# Patient Record
Sex: MALE | Race: ASIAN | NOT HISPANIC OR LATINO | URBAN - METROPOLITAN AREA
[De-identification: names, ages, dates, MRNs, and addresses within clinical notes are randomized per-mention and may not be internally consistent; named-entity substitution may affect disease eponyms.]

---

## 2019-12-26 ENCOUNTER — EMERGENCY (EMERGENCY)
Facility: HOSPITAL | Age: 29
LOS: 1 days | Discharge: HOME | End: 2019-12-26
Admitting: EMERGENCY MEDICINE
Payer: COMMERCIAL

## 2019-12-26 VITALS
DIASTOLIC BLOOD PRESSURE: 77 MMHG | RESPIRATION RATE: 18 BRPM | SYSTOLIC BLOOD PRESSURE: 114 MMHG | HEART RATE: 95 BPM | TEMPERATURE: 98 F | OXYGEN SATURATION: 100 %

## 2019-12-26 PROCEDURE — 73562 X-RAY EXAM OF KNEE 3: CPT | Mod: 26,LT

## 2019-12-26 PROCEDURE — 99283 EMERGENCY DEPT VISIT LOW MDM: CPT

## 2019-12-26 RX ORDER — IBUPROFEN 200 MG
600 TABLET ORAL ONCE
Refills: 0 | Status: COMPLETED | OUTPATIENT
Start: 2019-12-26 | End: 2019-12-26

## 2019-12-26 RX ADMIN — Medication 600 MILLIGRAM(S): at 14:01

## 2019-12-26 NOTE — ED PROVIDER NOTE - OBJECTIVE STATEMENT
[age] male with no pmh presents complaining of L knee pain and swelling since yesterday. States he was playing basketball when he landed on his foot weird and felt his knee "go one way and his body another way". Reports hearing a crack. States he was with his friends who are a PA and a doctor and that they did ROM testing. Reports being able to move FROM and walk with weightbearing on the leg after the injury. His friends suggested he go home ice/heat and take tylenol for the pain. He stated he was able to sleep fine with very little pain but when he awoke his knee was very swollen and much more painful causing him to feel unsteady when trying to weight bear and prompting his visit to the emergency room. Denies pain in the hip, thighs, calf and ankle and other associated symptoms.

## 2019-12-26 NOTE — ED ADULT TRIAGE NOTE - CHIEF COMPLAINT QUOTE
Pt with co left knee injury, pt states fell yesterday. pt reports was walking fine after injury but this am developed severe pain and noticed knee was swollen.

## 2019-12-26 NOTE — ED PROVIDER NOTE - NSFOLLOWUPINSTRUCTIONS_ED_ALL_ED_FT
Use knee immobilizer when walking or moving around. Can take off while resting, sleeping and showering. Use crutches to move around.   Call orthopedic clinic to make follow up appointment for further evaluation.   Continue to ice for today and then use heat compresses for 20minutes at a time as many times a day as you can to help with inflammation and pain.   Can take Motrin 600mg every 6 hours with food as needed for pain.

## 2019-12-26 NOTE — ED PROVIDER NOTE - PROGRESS NOTE DETAILS
Supervising Statement (ROXIE PLASCENCIA): I have personally seen and examined this patient.  I have fully participated in the care of this patient. I have reviewed all pertinent clinical information, including history, physical exam, plan and the ACP Fellow's note and agree except as noted.

## 2019-12-26 NOTE — ED ADULT NURSE NOTE - OBJECTIVE STATEMENT
pt brought to QL4 A&ox3, skin w/d/i, s/p mechanical fall yesterday, no signs of trauma noted, + pedal pulses, + ORm in all extremities, appears in NAD, motrina s per orders, pt moved to  for further monitoring.  (break)

## 2019-12-26 NOTE — ED PROVIDER NOTE - PATIENT PORTAL LINK FT
You can access the FollowMyHealth Patient Portal offered by [facility] by registering at the following website: [email or web address]. By joining [employer]’s FollowMyHealth portal, you will also be able to view your health information using other applications (apps) compatible with our system.

## 2019-12-26 NOTE — ED ADULT NURSE NOTE - NSIMPLEMENTINTERV_GEN_ALL_ED
Implemented All Fall Risk Interventions:  [place] to call system. Call bell, personal items and telephone within reach. Instruct patient to call for assistance. Room bathroom lighting operational. Non-slip footwear when patient is off stretcher. Physically safe environment: no spills, clutter or unnecessary equipment. Stretcher in lowest position, wheels locked, appropriate side rails in place. Provide visual cue, wrist band, yellow gown, etc. Monitor gait and stability. Monitor for mental status changes and reorient to person, place, and time. Review medications for side effects contributing to fall risk. Reinforce activity limits and safety measures with patient and family.

## 2019-12-26 NOTE — ED PROVIDER NOTE - CLINICAL SUMMARY MEDICAL DECISION MAKING FREE TEXT BOX
30yo male with no sig pmh presenting with L knee swelling and pain after landing wrong on his foot playing basketball x 1 day. Bony tenderness over medial knee. + joint effusion of the L knee on the medial and superior aspects. + pain with flexion/extension and weightbearing. FROM intact. No neurovascular compromise. Will get xray to rule out fracture. Low suspicion for dislocation, femur or tibial involvement at this time. Possible ligament injury, will consult ortho for outpatient follow up. Pain control with motrin for now.

## 2019-12-26 NOTE — ED PROVIDER NOTE - MUSCULOSKELETAL MINIMAL EXAM
atraumatic/medial and superior effusion of the knee, + pain with active and passive flexion and extension of the knee. + pain with palpation of the medial knee. No tibial or femur tenderness, no ankle or hip tenderness. No pain with axial loading or log rolling. sensation intact. +2 pulses. No crepitus./normal range of motion/no muscle tenderness

## 2019-12-27 ENCOUNTER — APPOINTMENT (OUTPATIENT)
Dept: ORTHOPEDIC SURGERY | Facility: CLINIC | Age: 29
End: 2019-12-27
Payer: COMMERCIAL

## 2019-12-27 VITALS — BODY MASS INDEX: 23.52 KG/M2 | WEIGHT: 168 LBS | HEIGHT: 71 IN

## 2019-12-27 DIAGNOSIS — S83.512A SPRAIN OF ANTERIOR CRUCIATE LIGAMENT OF LEFT KNEE, INITIAL ENCOUNTER: ICD-10-CM

## 2019-12-27 PROCEDURE — 99204 OFFICE O/P NEW MOD 45 MIN: CPT | Mod: 25

## 2019-12-27 PROCEDURE — 20610 DRAIN/INJ JOINT/BURSA W/O US: CPT | Mod: LT

## 2019-12-30 ENCOUNTER — FORM ENCOUNTER (OUTPATIENT)
Age: 29
End: 2019-12-30

## 2019-12-31 ENCOUNTER — OUTPATIENT (OUTPATIENT)
Dept: OUTPATIENT SERVICES | Facility: HOSPITAL | Age: 29
LOS: 1 days | End: 2019-12-31
Payer: COMMERCIAL

## 2019-12-31 ENCOUNTER — APPOINTMENT (OUTPATIENT)
Dept: MRI IMAGING | Facility: CLINIC | Age: 29
End: 2019-12-31
Payer: COMMERCIAL

## 2019-12-31 DIAGNOSIS — S83.512A SPRAIN OF ANTERIOR CRUCIATE LIGAMENT OF LEFT KNEE, INITIAL ENCOUNTER: ICD-10-CM

## 2019-12-31 PROCEDURE — 73721 MRI JNT OF LWR EXTRE W/O DYE: CPT

## 2019-12-31 PROCEDURE — 73721 MRI JNT OF LWR EXTRE W/O DYE: CPT | Mod: 26,LT

## 2022-07-01 ENCOUNTER — APPOINTMENT (OUTPATIENT)
Dept: INTERNAL MEDICINE | Facility: CLINIC | Age: 32
End: 2022-07-01

## 2022-07-01 ENCOUNTER — NON-APPOINTMENT (OUTPATIENT)
Age: 32
End: 2022-07-01

## 2022-07-01 VITALS
OXYGEN SATURATION: 98 % | HEART RATE: 102 BPM | SYSTOLIC BLOOD PRESSURE: 122 MMHG | WEIGHT: 170 LBS | HEIGHT: 71 IN | BODY MASS INDEX: 23.8 KG/M2 | DIASTOLIC BLOOD PRESSURE: 77 MMHG | TEMPERATURE: 98.5 F

## 2022-07-01 DIAGNOSIS — Z23 ENCOUNTER FOR IMMUNIZATION: ICD-10-CM

## 2022-07-01 DIAGNOSIS — Z82.49 FAMILY HISTORY OF ISCHEMIC HEART DISEASE AND OTHER DISEASES OF THE CIRCULATORY SYSTEM: ICD-10-CM

## 2022-07-01 DIAGNOSIS — R45.89 OTHER SYMPTOMS AND SIGNS INVOLVING EMOTIONAL STATE: ICD-10-CM

## 2022-07-01 DIAGNOSIS — Z87.898 PERSONAL HISTORY OF OTHER SPECIFIED CONDITIONS: ICD-10-CM

## 2022-07-01 DIAGNOSIS — Z83.438 FAMILY HISTORY OF OTHER DISORDER OF LIPOPROTEIN METABOLISM AND OTHER LIPIDEMIA: ICD-10-CM

## 2022-07-01 DIAGNOSIS — Z78.9 OTHER SPECIFIED HEALTH STATUS: ICD-10-CM

## 2022-07-01 DIAGNOSIS — Z83.3 FAMILY HISTORY OF DIABETES MELLITUS: ICD-10-CM

## 2022-07-01 DIAGNOSIS — Z82.41 FAMILY HISTORY OF SUDDEN CARDIAC DEATH: ICD-10-CM

## 2022-07-01 DIAGNOSIS — Z80.41 FAMILY HISTORY OF MALIGNANT NEOPLASM OF OVARY: ICD-10-CM

## 2022-07-01 PROCEDURE — 99203 OFFICE O/P NEW LOW 30 MIN: CPT | Mod: 25

## 2022-07-01 PROCEDURE — 99385 PREV VISIT NEW AGE 18-39: CPT

## 2022-07-01 NOTE — PHYSICAL EXAM
[No Acute Distress] : no acute distress [Well-Appearing] : well-appearing [Normal Sclera/Conjunctiva] : normal sclera/conjunctiva [EOMI] : extraocular movements intact [Normal Outer Ear/Nose] : the outer ears and nose were normal in appearance [Normal Oropharynx] : the oropharynx was normal [No JVD] : no jugular venous distention [Supple] : supple [No Respiratory Distress] : no respiratory distress  [No Accessory Muscle Use] : no accessory muscle use [Clear to Auscultation] : lungs were clear to auscultation bilaterally [Normal Rate] : normal rate  [Normal S1, S2] : normal S1 and S2 [No Edema] : there was no peripheral edema [Soft] : abdomen soft [Non-distended] : non-distended [Normal Supraclavicular Nodes] : no supraclavicular lymphadenopathy [Normal Posterior Cervical Nodes] : no posterior cervical lymphadenopathy [Normal Anterior Cervical Nodes] : no anterior cervical lymphadenopathy [No Joint Swelling] : no joint swelling [Grossly Normal Strength/Tone] : grossly normal strength/tone [No Rash] : no rash [Coordination Grossly Intact] : coordination grossly intact [Normal Gait] : normal gait [Speech Grossly Normal] : speech grossly normal [Normal Affect] : the affect was normal [Normal Mood] : the mood was normal [de-identified] : mild discomfort with palpation inferior to umbilical region

## 2022-07-01 NOTE — HISTORY OF PRESENT ILLNESS
[de-identified] : 32M PMH torn ACL s/p surgical repair who presents as a new patient for annual physical.\par \par He presents because he hasn't had a check-up in 2 years and wants a more accessible PMD. \par \par He notes some depressed mood in late high school / early college in which he was prescribed Lexapro, he never took it and hasn't had further issues with depressed mood. \par \par His primary complaint is that he has been having GI issues, in which he feels he has to go to the bathroom 4-5 times after he eats a large meal. He does note in childhood he had difficulty gaining weight and some diarrhea. Notes it in the morning if he's eaten late the previous night, but sometimes it occurs shortly after eating during the day. Not specifically noting relief after passing BM. He notes sometimes some blood on the toilet paper, but not in the bowl.\par He is unsure if there is an association with any specific type of food, possibly fatty meals. \par \par Immunizations: He has been vaccinated against COVID-19 with Pfizer x3. He hasn't had the flu shot in many years, usually doesn't. He recently had a Tdap\par \par Social Hx: Denies tobacco use. Drinks ~2 beers ~3x/wk, alcoholic mihaela 2 days/wk. 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 He primarily works from home. Lives with his wife and brother.

## 2022-07-01 NOTE — HEALTH RISK ASSESSMENT
[Never] : Never [2 - 4 times a month (2 pts)] : 2-4 times a month (2 points) [1 or 2 (0 pts)] : 1 or 2 (0 points) [Less than monthly (1 pt)] : Less than monthly (1 point) [Yes] : In the past 12 months have you used drugs other than those required for medical reasons? Yes [0] : 2) Feeling down, depressed, or hopeless: Not at all (0) [PHQ-2 Negative - No further assessment needed] : PHQ-2 Negative - No further assessment needed [Audit-CScore] : 3 [de-identified] : marijuana [[id number]] : 0

## 2022-07-01 NOTE — ASSESSMENT
[FreeTextEntry1] : 32M PMH torn ACL s/p surgical repair who presents as a new patient for annual physical.\par \par # Chronic Diarrhea:\par - CRP to r/o IBD\par - Celiac antibodies\par - Stool fat, stool osms\par - Rec keeping a food/symptom log to correlate.\par - GI referral\par \par # HCM\par - Up to date with COVID-19 vaccine\par - Defers flu shot, would recommend in the Fall\par - Up to date with Tdap \par - CBC, CMP, Lipid, A1c, Vit D\par - Urinalysis\par - Routine HIV screen

## 2022-07-07 ENCOUNTER — APPOINTMENT (OUTPATIENT)
Dept: GASTROENTEROLOGY | Facility: CLINIC | Age: 32
End: 2022-07-07

## 2022-07-07 VITALS
DIASTOLIC BLOOD PRESSURE: 76 MMHG | WEIGHT: 173 LBS | BODY MASS INDEX: 24.22 KG/M2 | HEART RATE: 87 BPM | SYSTOLIC BLOOD PRESSURE: 114 MMHG | HEIGHT: 71 IN

## 2022-07-07 DIAGNOSIS — R19.7 DIARRHEA, UNSPECIFIED: ICD-10-CM

## 2022-07-07 PROCEDURE — 99204 OFFICE O/P NEW MOD 45 MIN: CPT

## 2022-07-08 NOTE — REVIEW OF SYSTEMS
[Abdominal Pain] : abdominal pain [Diarrhea] : diarrhea [Negative] : Heme/Lymph [FreeTextEntry7] : Bloated, goes to the bathroom excessively, trace of blood

## 2022-07-08 NOTE — ASSESSMENT
[FreeTextEntry1] : 1.  Intermittent loose stools, abdominal cramping, bloating.  Suspect IBS- diarrhea.  Differential includes osmotic diarrhea (lactose, sorbitol or other nonabsorbed solute, SIBO), secretory diarrhea (bile-acid, microscopic colitis, villous adenoma) or inflammatory (IBD).\par \par Recs:\par - Recent labs reviewed with the patient.  TSH, HbA1C, celiac serologies within normal limits.  Patient with elevated lipids but reports that he was on vacation the week prior and was eating poorly.  He was advised on diet given cardiac history in family (father-CABG, uncle- sudden death).\par - Patient was advised to consider lactose-free diet or use lactase replacement enzymes with dairy.  He was also advised to cut out spicy foods and minimize fatty foods which can stimulate bile acid.\par - Additional stool studies ordered today.  If calprotectin is elevated, patient was advised that he should undergo colonoscopy to rule out Crohn's disease.  If calprotectin is normal, will consider trial of antispasmodic (dicyclomine or hyoscyamine).

## 2022-07-08 NOTE — HISTORY OF PRESENT ILLNESS
[Diarrhea] : diarrhea worsened [Abdominal Pain] : abdominal pain worsened [Abdominal Swelling] : abdominal swelling worsened [de-identified] : Abraham presents to the office today for evaluation of frequent BMs, abdominal cramping, and bloating.\par \par Over the past year, the patient reports an "upset stomach" with more frequent BMs, which are either formed or loose.  He can have up to 4-5 times BMs a day.  He wakes up in the morning with cramping pain in his lower abdomen with an urge to defecate.  He will have several episodes in the morning before he feels completely evacuated.  He sometimes sees trace amount of blood on the toilet paper after multiple BMs but denies any blood in the stool.  The patient has tried to pay attention to his diet and notes worsening symptoms if he eats larger meals or fatty meals.  He also wonders if drinking beer could be contributory.  He drinks 2-3 beers several days a week.  He has tried to cut out late night eating to see if his morning symptoms would improve.  When he was younger, the patient would have 2-3 BMs a day, soon after eating a meal.  He denies any dietary changes, upper GI symptoms, pain with eating, or family history of IBD, celiac disease or cancer.  He does feel bloated if he overeats.  His grandmother had ovarian cancer.

## 2022-07-11 LAB
25(OH)D3 SERPL-MCNC: 19.4 NG/ML
ALBUMIN SERPL ELPH-MCNC: 5 G/DL
ALP BLD-CCNC: 70 U/L
ALT SERPL-CCNC: 21 U/L
ANION GAP SERPL CALC-SCNC: 13 MMOL/L
APPEARANCE: CLEAR
AST SERPL-CCNC: 20 U/L
BACTERIA: NEGATIVE
BASOPHILS # BLD AUTO: 0.02 K/UL
BASOPHILS NFR BLD AUTO: 0.4 %
BILIRUB SERPL-MCNC: 0.5 MG/DL
BILIRUBIN URINE: NEGATIVE
BLOOD URINE: NEGATIVE
BUN SERPL-MCNC: 15 MG/DL
CALCIUM SERPL-MCNC: 10 MG/DL
CHLORIDE SERPL-SCNC: 102 MMOL/L
CHOLEST SERPL-MCNC: 249 MG/DL
CO2 SERPL-SCNC: 25 MMOL/L
COLOR: YELLOW
CREAT SERPL-MCNC: 1 MG/DL
CRP SERPL-MCNC: <3 MG/L
EGFR: 103 ML/MIN/1.73M2
ENDOMYSIUM IGA SER QL: NEGATIVE
ENDOMYSIUM IGA TITR SER: NORMAL
EOSINOPHIL # BLD AUTO: 0.12 K/UL
EOSINOPHIL NFR BLD AUTO: 2.6 %
ESTIMATED AVERAGE GLUCOSE: 111 MG/DL
GLUCOSE QUALITATIVE U: NEGATIVE
GLUCOSE SERPL-MCNC: 93 MG/DL
HBA1C MFR BLD HPLC: 5.5 %
HCT VFR BLD CALC: 44.4 %
HDLC SERPL-MCNC: 73 MG/DL
HGB BLD-MCNC: 15.4 G/DL
HIV1+2 AB SPEC QL IA.RAPID: NONREACTIVE
HYALINE CASTS: 0 /LPF
IMM GRANULOCYTES NFR BLD AUTO: 0.2 %
KETONES URINE: NEGATIVE
LDLC SERPL CALC-MCNC: 153 MG/DL
LEUKOCYTE ESTERASE URINE: NEGATIVE
LYMPHOCYTES # BLD AUTO: 2.02 K/UL
LYMPHOCYTES NFR BLD AUTO: 43 %
MAN DIFF?: NORMAL
MCHC RBC-ENTMCNC: 31 PG
MCHC RBC-ENTMCNC: 34.7 GM/DL
MCV RBC AUTO: 89.3 FL
MICROSCOPIC-UA: NORMAL
MONOCYTES # BLD AUTO: 0.38 K/UL
MONOCYTES NFR BLD AUTO: 8.1 %
NEUTROPHILS # BLD AUTO: 2.15 K/UL
NEUTROPHILS NFR BLD AUTO: 45.7 %
NITRITE URINE: NEGATIVE
NONHDLC SERPL-MCNC: 175 MG/DL
PH URINE: 6.5
PLATELET # BLD AUTO: 260 K/UL
POTASSIUM SERPL-SCNC: 4.5 MMOL/L
PROT SERPL-MCNC: 7.7 G/DL
PROTEIN URINE: NORMAL
RBC # BLD: 4.97 M/UL
RBC # FLD: 12.8 %
RED BLOOD CELLS URINE: 1 /HPF
SODIUM SERPL-SCNC: 139 MMOL/L
SPECIFIC GRAVITY URINE: 1.02
SQUAMOUS EPITHELIAL CELLS: 0 /HPF
TRIGL SERPL-MCNC: 111 MG/DL
TTG IGA SER IA-ACNC: 1.3 U/ML
TTG IGA SER-ACNC: NEGATIVE
TTG IGG SER IA-ACNC: 4.1 U/ML
TTG IGG SER IA-ACNC: NEGATIVE
UROBILINOGEN URINE: NORMAL
WBC # FLD AUTO: 4.7 K/UL
WHITE BLOOD CELLS URINE: 1 /HPF

## 2022-07-19 LAB
Lab: NORMAL
RETICULIN IGG SER-ACNC: NEGATIVE

## 2023-02-24 ENCOUNTER — APPOINTMENT (OUTPATIENT)
Dept: INTERNAL MEDICINE | Facility: CLINIC | Age: 33
End: 2023-02-24
Payer: COMMERCIAL

## 2023-02-24 ENCOUNTER — NON-APPOINTMENT (OUTPATIENT)
Age: 33
End: 2023-02-24

## 2023-02-24 ENCOUNTER — MED ADMIN CHARGE (OUTPATIENT)
Age: 33
End: 2023-02-24

## 2023-02-24 VITALS
WEIGHT: 176 LBS | HEIGHT: 71 IN | DIASTOLIC BLOOD PRESSURE: 89 MMHG | OXYGEN SATURATION: 97 % | HEART RATE: 92 BPM | SYSTOLIC BLOOD PRESSURE: 135 MMHG | BODY MASS INDEX: 24.64 KG/M2

## 2023-02-24 VITALS — DIASTOLIC BLOOD PRESSURE: 74 MMHG | SYSTOLIC BLOOD PRESSURE: 122 MMHG

## 2023-02-24 DIAGNOSIS — Z11.3 ENCOUNTER FOR SCREENING FOR INFECTIONS WITH A PREDOMINANTLY SEXUAL MODE OF TRANSMISSION: ICD-10-CM

## 2023-02-24 DIAGNOSIS — Z00.00 ENCOUNTER FOR GENERAL ADULT MEDICAL EXAMINATION W/OUT ABNORMAL FINDINGS: ICD-10-CM

## 2023-02-24 PROCEDURE — 36415 COLL VENOUS BLD VENIPUNCTURE: CPT

## 2023-02-24 PROCEDURE — 99385 PREV VISIT NEW AGE 18-39: CPT | Mod: 25

## 2023-02-24 NOTE — HEALTH RISK ASSESSMENT
[2 - 3 times a week (3 pts)] : 2 - 3  times a week (3 points) [1 or 2 (0 pts)] : 1 or 2 (0 points) [Never (0 pts)] : Never (0 points) [Yes] : In the past 12 months have you used drugs other than those required for medical reasons? Yes [0] : 2) Feeling down, depressed, or hopeless: Not at all (0) [PHQ-2 Negative - No further assessment needed] : PHQ-2 Negative - No further assessment needed [None] : None [With Family] : lives with family [Employed] : employed [] :  [# Of Children ___] : has [unfilled] children [Fully functional (bathing, dressing, toileting, transferring, walking, feeding)] : Fully functional (bathing, dressing, toileting, transferring, walking, feeding) [Fully functional (using the telephone, shopping, preparing meals, housekeeping, doing laundry, using] : Fully functional and needs no help or supervision to perform IADLs (using the telephone, shopping, preparing meals, housekeeping, doing laundry, using transportation, managing medications and managing finances) [Never] : Never [Audit-CScore] : 3 [de-identified] : marijuana use qd  [[id number]] : 0 [de-identified] : wife [FreeTextEntry2] : PT [employer], FT  at bank  [FreeTextEntry3] : wife is pregnant currently

## 2023-02-24 NOTE — REVIEW OF SYSTEMS
decreased ability to use arms for pushing/pulling/decreased ability to use legs for bridging/pushing/impaired ability to control trunk for mobility
[Negative] : Heme/Lymph

## 2023-02-24 NOTE — HISTORY OF PRESENT ILLNESS
[FreeTextEntry1] : cpe/est care [de-identified] : MONISHA ARCINIEGA is a 32 year M who presents for CPE/est care\par PMHx HLD\par Feels well overall\par Previous GI sx including frequent abdominal bloating/cramping and diarrhea resolving since making lifestyle changes including exercise, decreasing beer intake. \par Covid vaccines- s/p 3 doses \par Tdap- 6/2022

## 2023-02-24 NOTE — ASSESSMENT
[FreeTextEntry1] : HCM\par -well visit\par -routine labs- drawn in house\par -flu shot- given and tolerated well \par -tdap- 6/2022, utd\par \par HLD\par -patient working on lifestyle modifications\par -f/u lipid panel \par

## 2023-03-03 LAB
25(OH)D3 SERPL-MCNC: 18.3 NG/ML
ALBUMIN SERPL ELPH-MCNC: 5 G/DL
ALP BLD-CCNC: 65 U/L
ALT SERPL-CCNC: 17 U/L
ANION GAP SERPL CALC-SCNC: 13 MMOL/L
AST SERPL-CCNC: 19 U/L
BASOPHILS # BLD AUTO: 0.03 K/UL
BASOPHILS NFR BLD AUTO: 0.6 %
BILIRUB SERPL-MCNC: 0.6 MG/DL
BUN SERPL-MCNC: 14 MG/DL
C TRACH RRNA SPEC QL NAA+PROBE: NOT DETECTED
CALCIUM SERPL-MCNC: 10 MG/DL
CHLORIDE SERPL-SCNC: 103 MMOL/L
CHOLEST SERPL-MCNC: 260 MG/DL
CO2 SERPL-SCNC: 24 MMOL/L
CREAT SERPL-MCNC: 0.95 MG/DL
EGFR: 110 ML/MIN/1.73M2
EOSINOPHIL # BLD AUTO: 0.13 K/UL
EOSINOPHIL NFR BLD AUTO: 2.6 %
ESTIMATED AVERAGE GLUCOSE: 117 MG/DL
GLUCOSE SERPL-MCNC: 103 MG/DL
HAV IGM SER QL: NONREACTIVE
HBA1C MFR BLD HPLC: 5.7 %
HBV CORE IGM SER QL: NONREACTIVE
HBV SURFACE AG SER QL: NONREACTIVE
HCT VFR BLD CALC: 45.7 %
HCV AB SER QL: NONREACTIVE
HCV S/CO RATIO: 0.08 S/CO
HDLC SERPL-MCNC: 56 MG/DL
HGB BLD-MCNC: 15.3 G/DL
HIV1+2 AB SPEC QL IA.RAPID: NONREACTIVE
IMM GRANULOCYTES NFR BLD AUTO: 0.2 %
LDLC SERPL CALC-MCNC: 172 MG/DL
LYMPHOCYTES # BLD AUTO: 2.18 K/UL
LYMPHOCYTES NFR BLD AUTO: 43.3 %
MAN DIFF?: NORMAL
MCHC RBC-ENTMCNC: 29.5 PG
MCHC RBC-ENTMCNC: 33.5 GM/DL
MCV RBC AUTO: 88.1 FL
MONOCYTES # BLD AUTO: 0.46 K/UL
MONOCYTES NFR BLD AUTO: 9.1 %
N GONORRHOEA RRNA SPEC QL NAA+PROBE: NOT DETECTED
NEUTROPHILS # BLD AUTO: 2.23 K/UL
NEUTROPHILS NFR BLD AUTO: 44.2 %
NONHDLC SERPL-MCNC: 204 MG/DL
PLATELET # BLD AUTO: 285 K/UL
POTASSIUM SERPL-SCNC: 3.9 MMOL/L
PROT SERPL-MCNC: 7.9 G/DL
RBC # BLD: 5.19 M/UL
RBC # FLD: 12.4 %
SODIUM SERPL-SCNC: 140 MMOL/L
SOURCE AMPLIFICATION: NORMAL
T PALLIDUM AB SER QL IA: NEGATIVE
TRIGL SERPL-MCNC: 162 MG/DL
TSH SERPL-ACNC: 1.38 UIU/ML
WBC # FLD AUTO: 5.04 K/UL

## 2023-03-31 ENCOUNTER — APPOINTMENT (OUTPATIENT)
Dept: INTERNAL MEDICINE | Facility: CLINIC | Age: 33
End: 2023-03-31
Payer: COMMERCIAL

## 2023-03-31 VITALS
HEART RATE: 95 BPM | DIASTOLIC BLOOD PRESSURE: 73 MMHG | OXYGEN SATURATION: 98 % | WEIGHT: 176 LBS | BODY MASS INDEX: 24.64 KG/M2 | TEMPERATURE: 98 F | SYSTOLIC BLOOD PRESSURE: 106 MMHG | HEIGHT: 71 IN

## 2023-03-31 DIAGNOSIS — Z01.818 ENCOUNTER FOR OTHER PREPROCEDURAL EXAMINATION: ICD-10-CM

## 2023-03-31 PROCEDURE — 36415 COLL VENOUS BLD VENIPUNCTURE: CPT

## 2023-03-31 PROCEDURE — 99213 OFFICE O/P EST LOW 20 MIN: CPT | Mod: 25

## 2023-03-31 NOTE — PHYSICAL EXAM
[No Acute Distress] : no acute distress [Well-Appearing] : well-appearing [Normal Sclera/Conjunctiva] : normal sclera/conjunctiva [EOMI] : extraocular movements intact [No Respiratory Distress] : no respiratory distress  [No Accessory Muscle Use] : no accessory muscle use [Clear to Auscultation] : lungs were clear to auscultation bilaterally [Normal Rate] : normal rate  [Regular Rhythm] : with a regular rhythm [Normal S1, S2] : normal S1 and S2 [No Edema] : there was no peripheral edema [No Extremity Clubbing/Cyanosis] : no extremity clubbing/cyanosis [Soft] : abdomen soft [Non Tender] : non-tender [Non-distended] : non-distended [Normal Bowel Sounds] : normal bowel sounds [Coordination Grossly Intact] : coordination grossly intact [No Focal Deficits] : no focal deficits [Normal Gait] : normal gait [Normal Affect] : the affect was normal [Normal Insight/Judgement] : insight and judgment were intact

## 2023-04-03 LAB
APTT BLD: 32.2 SEC
BASOPHILS # BLD AUTO: 0.02 K/UL
BASOPHILS NFR BLD AUTO: 0.4 %
EOSINOPHIL # BLD AUTO: 0.17 K/UL
EOSINOPHIL NFR BLD AUTO: 3.3 %
HCT VFR BLD CALC: 47.2 %
HGB BLD-MCNC: 15.1 G/DL
IMM GRANULOCYTES NFR BLD AUTO: 0.2 %
INR PPP: 1.02 RATIO
LYMPHOCYTES # BLD AUTO: 2.29 K/UL
LYMPHOCYTES NFR BLD AUTO: 44 %
MAN DIFF?: NORMAL
MCHC RBC-ENTMCNC: 28.8 PG
MCHC RBC-ENTMCNC: 32 GM/DL
MCV RBC AUTO: 90.1 FL
MONOCYTES # BLD AUTO: 0.42 K/UL
MONOCYTES NFR BLD AUTO: 8.1 %
NEUTROPHILS # BLD AUTO: 2.29 K/UL
NEUTROPHILS NFR BLD AUTO: 44 %
PLATELET # BLD AUTO: 278 K/UL
PT BLD: 11.8 SEC
RBC # BLD: 5.24 M/UL
RBC # FLD: 12.9 %
WBC # FLD AUTO: 5.2 K/UL

## 2023-04-03 NOTE — ASSESSMENT
[Patient Optimized for Surgery] : Patient optimized for surgery [FreeTextEntry4] : -planned for Left knee arthroscopy ACL quad autograft ITB tenodesis revision \par -cbc. pt/ptt/inr ordered\par -no prior adverse effects to anesthesia\par -able to perform >4METS\par -to avoid asa, nsaids and vitamins for 1 week prior to surgery\par \par Addendum 4/3/2023\par CBC, PT/PTT/INR all wnl\par Patient is medically optimized (RCRI = 0) for intermediate risk procedure with routine hemodynamic monitoring. \par

## 2023-04-03 NOTE — HISTORY OF PRESENT ILLNESS
[No Pertinent Cardiac History] : no history of aortic stenosis, atrial fibrillation, coronary artery disease, recent myocardial infarction, or implantable device/pacemaker [No Pertinent Pulmonary History] : no history of asthma, COPD, sleep apnea, or smoking [No Adverse Anesthesia Reaction] : no adverse anesthesia reaction in self or family member [(Patient denies any chest pain, claudication, dyspnea on exertion, orthopnea, palpitations or syncope)] : Patient denies any chest pain, claudication, dyspnea on exertion, orthopnea, palpitations or syncope [Aortic Stenosis] : no aortic stenosis [Atrial Fibrillation] : no atrial fibrillation [Coronary Artery Disease] : no coronary artery disease [Recent Myocardial Infarction] : no recent myocardial infarction [Implantable Device/Pacemaker] : no implantable device/pacemaker [Asthma] : no asthma [COPD] : no COPD [Sleep Apnea] : no sleep apnea [Smoker] : not a smoker [Chronic Anticoagulation] : no chronic anticoagulation [Chronic Kidney Disease] : no chronic kidney disease [Diabetes] : no diabetes [FreeTextEntry1] : Left knee arthroscopy ACL quad autograft ITB tenodesis revision  [FreeTextEntry2] : 4/12/2023 [FreeTextEntry3] : Dr. Van Lizama  [FreeTextEntry4] : MONISHA ARCINIEGA is a 32 year M who presents for preop\par Feels well overall  [FreeTextEntry8] : able to perform >4 METS

## 2023-10-23 ENCOUNTER — APPOINTMENT (OUTPATIENT)
Dept: INTERNAL MEDICINE | Facility: CLINIC | Age: 33
End: 2023-10-23
Payer: COMMERCIAL

## 2023-10-23 VITALS
HEIGHT: 71 IN | TEMPERATURE: 98.4 F | DIASTOLIC BLOOD PRESSURE: 80 MMHG | HEART RATE: 84 BPM | OXYGEN SATURATION: 98 % | SYSTOLIC BLOOD PRESSURE: 115 MMHG | BODY MASS INDEX: 24.22 KG/M2 | WEIGHT: 173 LBS

## 2023-10-23 PROCEDURE — G0008: CPT

## 2023-10-23 PROCEDURE — 90686 IIV4 VACC NO PRSV 0.5 ML IM: CPT

## 2023-10-23 PROCEDURE — 99214 OFFICE O/P EST MOD 30 MIN: CPT | Mod: 25

## 2023-10-27 ENCOUNTER — APPOINTMENT (OUTPATIENT)
Dept: INTERNAL MEDICINE | Facility: CLINIC | Age: 33
End: 2023-10-27
Payer: COMMERCIAL

## 2023-10-27 DIAGNOSIS — E78.5 HYPERLIPIDEMIA, UNSPECIFIED: ICD-10-CM

## 2023-10-27 DIAGNOSIS — E55.9 VITAMIN D DEFICIENCY, UNSPECIFIED: ICD-10-CM

## 2023-10-27 DIAGNOSIS — R73.03 PREDIABETES.: ICD-10-CM

## 2023-10-27 PROCEDURE — 36415 COLL VENOUS BLD VENIPUNCTURE: CPT

## 2023-11-01 LAB
25(OH)D3 SERPL-MCNC: 10.8 NG/ML
CHOLEST SERPL-MCNC: 215 MG/DL
ESTIMATED AVERAGE GLUCOSE: 114 MG/DL
HBA1C MFR BLD HPLC: 5.6 %
HDLC SERPL-MCNC: 52 MG/DL
LDLC SERPL CALC-MCNC: 128 MG/DL
NONHDLC SERPL-MCNC: 163 MG/DL
TRIGL SERPL-MCNC: 198 MG/DL

## 2023-11-01 RX ORDER — ERGOCALCIFEROL 1.25 MG/1
1.25 MG CAPSULE, LIQUID FILLED ORAL
Qty: 8 | Refills: 0 | Status: ACTIVE | COMMUNITY
Start: 2023-11-01 | End: 1900-01-01

## 2023-12-04 ENCOUNTER — TRANSCRIPTION ENCOUNTER (OUTPATIENT)
Age: 33
End: 2023-12-04

## 2024-11-18 ENCOUNTER — APPOINTMENT (OUTPATIENT)
Dept: INTERNAL MEDICINE | Facility: CLINIC | Age: 34
End: 2024-11-18
Payer: COMMERCIAL

## 2024-11-18 VITALS
TEMPERATURE: 98.8 F | WEIGHT: 163 LBS | HEIGHT: 71 IN | OXYGEN SATURATION: 97 % | HEART RATE: 95 BPM | SYSTOLIC BLOOD PRESSURE: 109 MMHG | DIASTOLIC BLOOD PRESSURE: 71 MMHG | BODY MASS INDEX: 22.82 KG/M2

## 2024-11-18 DIAGNOSIS — R19.7 DIARRHEA, UNSPECIFIED: ICD-10-CM

## 2024-11-18 DIAGNOSIS — Z00.00 ENCOUNTER FOR GENERAL ADULT MEDICAL EXAMINATION W/OUT ABNORMAL FINDINGS: ICD-10-CM

## 2024-11-18 DIAGNOSIS — E55.9 VITAMIN D DEFICIENCY, UNSPECIFIED: ICD-10-CM

## 2024-11-18 DIAGNOSIS — E78.5 HYPERLIPIDEMIA, UNSPECIFIED: ICD-10-CM

## 2024-11-18 PROCEDURE — G0008: CPT

## 2024-11-18 PROCEDURE — 90656 IIV3 VACC NO PRSV 0.5 ML IM: CPT

## 2024-11-18 PROCEDURE — 99395 PREV VISIT EST AGE 18-39: CPT | Mod: 25

## 2024-11-20 ENCOUNTER — APPOINTMENT (OUTPATIENT)
Dept: INTERNAL MEDICINE | Facility: CLINIC | Age: 34
End: 2024-11-20

## 2025-06-05 ENCOUNTER — APPOINTMENT (OUTPATIENT)
Dept: INTERNAL MEDICINE | Facility: CLINIC | Age: 35
End: 2025-06-05
Payer: COMMERCIAL

## 2025-06-05 DIAGNOSIS — E78.5 HYPERLIPIDEMIA, UNSPECIFIED: ICD-10-CM

## 2025-06-05 DIAGNOSIS — Z00.00 ENCOUNTER FOR GENERAL ADULT MEDICAL EXAMINATION W/OUT ABNORMAL FINDINGS: ICD-10-CM

## 2025-06-05 DIAGNOSIS — E55.9 VITAMIN D DEFICIENCY, UNSPECIFIED: ICD-10-CM

## 2025-06-05 PROCEDURE — G2211 COMPLEX E/M VISIT ADD ON: CPT | Mod: NC,95

## 2025-06-05 PROCEDURE — 99213 OFFICE O/P EST LOW 20 MIN: CPT | Mod: 95
